# Patient Record
Sex: MALE | Race: WHITE | NOT HISPANIC OR LATINO
[De-identification: names, ages, dates, MRNs, and addresses within clinical notes are randomized per-mention and may not be internally consistent; named-entity substitution may affect disease eponyms.]

---

## 2024-01-24 PROBLEM — Z00.00 ENCOUNTER FOR PREVENTIVE HEALTH EXAMINATION: Status: ACTIVE | Noted: 2024-01-24

## 2024-01-30 ENCOUNTER — LABORATORY RESULT (OUTPATIENT)
Age: 34
End: 2024-01-30

## 2024-01-30 ENCOUNTER — APPOINTMENT (OUTPATIENT)
Dept: PULMONOLOGY | Facility: CLINIC | Age: 34
End: 2024-01-30
Payer: COMMERCIAL

## 2024-01-30 ENCOUNTER — TRANSCRIPTION ENCOUNTER (OUTPATIENT)
Age: 34
End: 2024-01-30

## 2024-01-30 VITALS
DIASTOLIC BLOOD PRESSURE: 74 MMHG | TEMPERATURE: 97.9 F | OXYGEN SATURATION: 98 % | HEIGHT: 72 IN | WEIGHT: 185 LBS | SYSTOLIC BLOOD PRESSURE: 120 MMHG | BODY MASS INDEX: 25.06 KG/M2 | HEART RATE: 80 BPM

## 2024-01-30 DIAGNOSIS — J45.50 SEVERE PERSISTENT ASTHMA, UNCOMPLICATED: ICD-10-CM

## 2024-01-30 PROCEDURE — 95012 NITRIC OXIDE EXP GAS DETER: CPT

## 2024-01-30 PROCEDURE — 94010 BREATHING CAPACITY TEST: CPT

## 2024-01-30 PROCEDURE — 99203 OFFICE O/P NEW LOW 30 MIN: CPT | Mod: 25

## 2024-01-30 RX ORDER — PREDNISONE 10 MG/1
10 TABLET ORAL
Qty: 30 | Refills: 11 | Status: ACTIVE | COMMUNITY
Start: 2024-01-30 | End: 1900-01-01

## 2024-01-31 LAB — EOSINOPHIL # BLD MANUAL: 130 /UL

## 2024-01-31 NOTE — HISTORY OF PRESENT ILLNESS
[TextBox_4] : volunteer trip doing [employer] in [place] and becamse sob childhood asthma each day breathing get worse moldy allergenx placed on symb and prednisone and felt transiently better but symb is not helping no cough albuterol did not help asthma in the past never on chronic medications when he stopped the prednisone  got covid a week  normal spirometry now, but has been treated with 5 days of prednios and is o syb but despite this still has niox of 115 ppb.

## 2024-01-31 NOTE — PHYSICAL EXAM
[No Acute Distress] : no acute distress [Normal Oropharynx] : normal oropharynx [Normal Appearance] : normal appearance [No Neck Mass] : no neck mass [Normal Rate/Rhythm] : normal rate/rhythm [Normal S1, S2] : normal s1, s2 [No Murmurs] : no murmurs [No Resp Distress] : no resp distress [No Abnormalities] : no abnormalities [Benign] : benign [Normal Gait] : normal gait [No Clubbing] : no clubbing [No Edema] : no edema [No Cyanosis] : no cyanosis [FROM] : FROM [Normal Color/ Pigmentation] : normal color/ pigmentation [No Focal Deficits] : no focal deficits [Oriented x3] : oriented x3 [Normal Affect] : normal affect [TextBox_68] : mild wheeze

## 2024-01-31 NOTE — DISCUSSION/SUMMARY
[FreeTextEntry1] : may have asthma but on present meds the flows are normal despite sterois and symb his niox is quite high perplexing!~  will simply give a 12 day course of prednisone and see if this works blood drawn for atopy

## 2024-02-02 LAB
A ALTERNATA IGE QN: 2.37 KUA/L
A FUMIGATUS IGE QN: 2.04 KUA/L
C ALBICANS IGE QN: 2.19 KUA/L
C HERBARUM IGE QN: 1.46 KUA/L
CAT DANDER IGE QN: 1.99 KUA/L
COMMON RAGWEED IGE QN: 0.49 KUA/L
D FARINAE IGE QN: 0.38 KUA/L
D PTERONYSS IGE QN: 0.27 KUA/L
DEPRECATED A ALTERNATA IGE RAST QL: 2 (ref 0–?)
DEPRECATED A FUMIGATUS IGE RAST QL: 2 (ref 0–?)
DEPRECATED C ALBICANS IGE RAST QL: 2
DEPRECATED C HERBARUM IGE RAST QL: 2 (ref 0–?)
DEPRECATED CAT DANDER IGE RAST QL: 2 (ref 0–?)
DEPRECATED COMMON RAGWEED IGE RAST QL: 1 (ref 0–?)
DEPRECATED D FARINAE IGE RAST QL: 1 (ref 0–?)
DEPRECATED D PTERONYSS IGE RAST QL: NORMAL (ref 0–?)
DEPRECATED DOG DANDER IGE RAST QL: 2 (ref 0–?)
DEPRECATED M RACEMOSUS IGE RAST QL: NORMAL
DEPRECATED ROACH IGE RAST QL: 0 (ref 0–?)
DEPRECATED TIMOTHY IGE RAST QL: 3 (ref 0–?)
DEPRECATED WHITE OAK IGE RAST QL: 2 (ref 0–?)
DOG DANDER IGE QN: 1.39 KUA/L
M RACEMOSUS IGE QN: 0.18 KUA/L
ROACH IGE QN: <0.1 KUA/L
TIMOTHY IGE QN: 3.76 KUA/L
TOTAL IGE SMQN RAST: 276 KU/L
WHITE OAK IGE QN: 1.17 KUA/L

## 2024-02-29 ENCOUNTER — APPOINTMENT (OUTPATIENT)
Dept: PULMONOLOGY | Facility: CLINIC | Age: 34
End: 2024-02-29
Payer: COMMERCIAL

## 2024-02-29 VITALS
TEMPERATURE: 97.8 F | HEART RATE: 60 BPM | RESPIRATION RATE: 16 BRPM | WEIGHT: 191.13 LBS | DIASTOLIC BLOOD PRESSURE: 78 MMHG | BODY MASS INDEX: 27.36 KG/M2 | OXYGEN SATURATION: 98 % | SYSTOLIC BLOOD PRESSURE: 126 MMHG | HEIGHT: 70 IN

## 2024-02-29 PROCEDURE — 94010 BREATHING CAPACITY TEST: CPT

## 2024-02-29 PROCEDURE — 99213 OFFICE O/P EST LOW 20 MIN: CPT | Mod: 25

## 2024-02-29 PROCEDURE — 95012 NITRIC OXIDE EXP GAS DETER: CPT

## 2024-03-01 NOTE — PHYSICAL EXAM
[No Acute Distress] : no acute distress [Normal Appearance] : normal appearance [Normal Oropharynx] : normal oropharynx [No Neck Mass] : no neck mass [Normal S1, S2] : normal s1, s2 [Normal Rate/Rhythm] : normal rate/rhythm [No Murmurs] : no murmurs [Clear to Auscultation Bilaterally] : clear to auscultation bilaterally [No Resp Distress] : no resp distress [Benign] : benign [No Abnormalities] : no abnormalities [No Clubbing] : no clubbing [Normal Gait] : normal gait [No Cyanosis] : no cyanosis [FROM] : FROM [No Edema] : no edema [No Focal Deficits] : no focal deficits [Normal Color/ Pigmentation] : normal color/ pigmentation [Oriented x3] : oriented x3 [Normal Affect] : normal affect

## 2024-03-01 NOTE — PROCEDURE
[FreeTextEntry1] : spiroemtry done for cough  normal niox done because of high niox before still elevated, but down to 53

## 2024-03-01 NOTE — DISCUSSION/SUMMARY
[FreeTextEntry1] : his cough is gone and he is breathing well but discussed the niox also the asha profile with many antigent he will likely be seen in the future with the same has my email for futher communication